# Patient Record
Sex: FEMALE | Race: WHITE | URBAN - METROPOLITAN AREA
[De-identification: names, ages, dates, MRNs, and addresses within clinical notes are randomized per-mention and may not be internally consistent; named-entity substitution may affect disease eponyms.]

---

## 2023-02-17 ENCOUNTER — WEB ENCOUNTER (OUTPATIENT)
Dept: URBAN - METROPOLITAN AREA CLINIC 70 | Facility: CLINIC | Age: 68
End: 2023-02-17

## 2023-02-17 ENCOUNTER — OFFICE VISIT (OUTPATIENT)
Dept: URBAN - METROPOLITAN AREA CLINIC 70 | Facility: CLINIC | Age: 68
End: 2023-02-17
Payer: COMMERCIAL

## 2023-02-17 ENCOUNTER — LAB OUTSIDE AN ENCOUNTER (OUTPATIENT)
Dept: URBAN - METROPOLITAN AREA CLINIC 70 | Facility: CLINIC | Age: 68
End: 2023-02-17

## 2023-02-17 ENCOUNTER — DASHBOARD ENCOUNTERS (OUTPATIENT)
Age: 68
End: 2023-02-17

## 2023-02-17 VITALS
HEIGHT: 62 IN | DIASTOLIC BLOOD PRESSURE: 88 MMHG | SYSTOLIC BLOOD PRESSURE: 151 MMHG | WEIGHT: 161.4 LBS | TEMPERATURE: 97.4 F | BODY MASS INDEX: 29.7 KG/M2 | HEART RATE: 90 BPM

## 2023-02-17 DIAGNOSIS — R32 UNSPECIFIED URINARY INCONTINENCE: ICD-10-CM

## 2023-02-17 DIAGNOSIS — R15.9 FULL INCONTINENCE OF FECES: ICD-10-CM

## 2023-02-17 DIAGNOSIS — Z87.19 HISTORY OF DIVERTICULITIS: ICD-10-CM

## 2023-02-17 DIAGNOSIS — Z12.11 COLON CANCER SCREENING: ICD-10-CM

## 2023-02-17 PROBLEM — 1086911000119107: Status: ACTIVE | Noted: 2023-02-17

## 2023-02-17 PROBLEM — 78459008: Status: ACTIVE | Noted: 2023-02-17

## 2023-02-17 PROCEDURE — 99204 OFFICE O/P NEW MOD 45 MIN: CPT

## 2023-02-17 NOTE — HPI-TODAY'S VISIT:
The pt presents for diverticulitis and fecal and bladder incontinence.  She states that on 1/3/2023 she went to [facility] for severe LLQ abdominal pain.  CT was performed and confirmed diverticulitis.  Records requested. She was treated with 10 days of oral Cipro and Flagyl.  She states abdominal pain completely resolved with the abx treatment.  Today she states abdominal pain now occurs occasionally.  No abdominal TTP on exam.  Her last colonoscopy was >10 years ago with normal results per the pt.  She denies a fhx of colon cancer.  She denies weight loss, fever, diarrhea, constipation, or rectal bleeding.   She notes a hx of fecal and urinary incontinence.  She had previously seen a urologist for these symptoms and states they improved for a few months, but now seem to be occuring more frequently. Of note, the pt did not have a copy of her medications with her today.  She states she will bring them by the office prior to her procedure.

## 2023-03-28 ENCOUNTER — OFFICE VISIT (OUTPATIENT)
Dept: URBAN - METROPOLITAN AREA SURGERY CENTER 24 | Facility: SURGERY CENTER | Age: 68
End: 2023-03-28
Payer: COMMERCIAL

## 2023-03-28 DIAGNOSIS — Z12.11 COLON CANCER SCREENING: ICD-10-CM

## 2023-03-28 PROCEDURE — G8907 PT DOC NO EVENTS ON DISCHARG: HCPCS | Performed by: INTERNAL MEDICINE

## 2023-03-28 PROCEDURE — 45378 DIAGNOSTIC COLONOSCOPY: CPT | Performed by: INTERNAL MEDICINE
